# Patient Record
Sex: FEMALE | Race: WHITE | NOT HISPANIC OR LATINO | URBAN - METROPOLITAN AREA
[De-identification: names, ages, dates, MRNs, and addresses within clinical notes are randomized per-mention and may not be internally consistent; named-entity substitution may affect disease eponyms.]

---

## 2017-03-26 ENCOUNTER — EMERGENCY (EMERGENCY)
Facility: HOSPITAL | Age: 22
LOS: 1 days | Discharge: HOME | End: 2017-03-26
Attending: EMERGENCY MEDICINE | Admitting: EMERGENCY MEDICINE
Payer: COMMERCIAL

## 2017-03-26 VITALS
SYSTOLIC BLOOD PRESSURE: 128 MMHG | OXYGEN SATURATION: 100 % | TEMPERATURE: 98 F | DIASTOLIC BLOOD PRESSURE: 87 MMHG | RESPIRATION RATE: 18 BRPM | HEART RATE: 103 BPM

## 2017-03-26 VITALS
TEMPERATURE: 98 F | SYSTOLIC BLOOD PRESSURE: 110 MMHG | DIASTOLIC BLOOD PRESSURE: 60 MMHG | OXYGEN SATURATION: 100 % | RESPIRATION RATE: 16 BRPM | HEART RATE: 77 BPM

## 2017-03-26 DIAGNOSIS — R56.9 UNSPECIFIED CONVULSIONS: ICD-10-CM

## 2017-03-26 DIAGNOSIS — R07.89 OTHER CHEST PAIN: ICD-10-CM

## 2017-03-26 LAB
ALBUMIN SERPL ELPH-MCNC: 4.3 G/DL — SIGNIFICANT CHANGE UP (ref 3.4–5)
ALP SERPL-CCNC: 67 U/L — SIGNIFICANT CHANGE UP (ref 40–120)
ALT FLD-CCNC: 17 U/L — SIGNIFICANT CHANGE UP (ref 12–42)
ANION GAP SERPL CALC-SCNC: 13 MMOL/L — SIGNIFICANT CHANGE UP (ref 9–16)
AST SERPL-CCNC: 22 U/L — SIGNIFICANT CHANGE UP (ref 15–37)
BASOPHILS NFR BLD AUTO: 0.3 % — SIGNIFICANT CHANGE UP (ref 0–2)
BILIRUB SERPL-MCNC: 0.6 MG/DL — SIGNIFICANT CHANGE UP (ref 0.2–1.2)
BUN SERPL-MCNC: 13 MG/DL — SIGNIFICANT CHANGE UP (ref 7–23)
CALCIUM SERPL-MCNC: 9.3 MG/DL — SIGNIFICANT CHANGE UP (ref 8.5–10.5)
CHLORIDE SERPL-SCNC: 105 MMOL/L — SIGNIFICANT CHANGE UP (ref 96–108)
CO2 SERPL-SCNC: 22 MMOL/L — SIGNIFICANT CHANGE UP (ref 22–31)
CREAT SERPL-MCNC: 0.79 MG/DL — SIGNIFICANT CHANGE UP (ref 0.5–1.3)
D DIMER BLD IA.RAPID-MCNC: 558 NG/ML DDU — HIGH
EOSINOPHIL NFR BLD AUTO: 0.7 % — SIGNIFICANT CHANGE UP (ref 0–6)
GLUCOSE SERPL-MCNC: 140 MG/DL — HIGH (ref 70–99)
HCG SERPL-ACNC: <1 MIU/ML — SIGNIFICANT CHANGE UP
HCT VFR BLD CALC: 42 % — SIGNIFICANT CHANGE UP (ref 34.5–45)
HGB BLD-MCNC: 14.1 G/DL — SIGNIFICANT CHANGE UP (ref 11.5–15.5)
IMM GRANULOCYTES NFR BLD AUTO: 2.2 % — HIGH (ref 0–1.5)
LYMPHOCYTES # BLD AUTO: 16.7 % — SIGNIFICANT CHANGE UP (ref 13–44)
MAGNESIUM SERPL-MCNC: 1.6 MG/DL — SIGNIFICANT CHANGE UP (ref 1.6–2.4)
MCHC RBC-ENTMCNC: 29.3 PG — SIGNIFICANT CHANGE UP (ref 27–34)
MCHC RBC-ENTMCNC: 33.6 G/DL — SIGNIFICANT CHANGE UP (ref 32–36)
MCV RBC AUTO: 87.3 FL — SIGNIFICANT CHANGE UP (ref 80–100)
MONOCYTES NFR BLD AUTO: 4.1 % — SIGNIFICANT CHANGE UP (ref 2–14)
NEUTROPHILS NFR BLD AUTO: 76 % — SIGNIFICANT CHANGE UP (ref 43–77)
PLATELET # BLD AUTO: 299 K/UL — SIGNIFICANT CHANGE UP (ref 150–400)
POTASSIUM SERPL-MCNC: 4 MMOL/L — SIGNIFICANT CHANGE UP (ref 3.5–5.3)
POTASSIUM SERPL-SCNC: 4 MMOL/L — SIGNIFICANT CHANGE UP (ref 3.5–5.3)
PROT SERPL-MCNC: 7.9 G/DL — SIGNIFICANT CHANGE UP (ref 6.4–8.2)
RBC # BLD: 4.81 M/UL — SIGNIFICANT CHANGE UP (ref 3.8–5.2)
RBC # FLD: 13.3 % — SIGNIFICANT CHANGE UP (ref 10.3–16.9)
SODIUM SERPL-SCNC: 140 MMOL/L — SIGNIFICANT CHANGE UP (ref 132–145)
WBC # BLD: 9.6 K/UL — SIGNIFICANT CHANGE UP (ref 3.8–10.5)
WBC # FLD AUTO: 9.6 K/UL — SIGNIFICANT CHANGE UP (ref 3.8–10.5)

## 2017-03-26 PROCEDURE — 71275 CT ANGIOGRAPHY CHEST: CPT | Mod: 26

## 2017-03-26 PROCEDURE — 99285 EMERGENCY DEPT VISIT HI MDM: CPT | Mod: 25

## 2017-03-26 PROCEDURE — 70450 CT HEAD/BRAIN W/O DYE: CPT | Mod: 26

## 2017-03-26 PROCEDURE — 93010 ELECTROCARDIOGRAM REPORT: CPT | Mod: NC

## 2017-03-26 RX ORDER — SODIUM CHLORIDE 9 MG/ML
1000 INJECTION INTRAMUSCULAR; INTRAVENOUS; SUBCUTANEOUS ONCE
Qty: 0 | Refills: 0 | Status: COMPLETED | OUTPATIENT
Start: 2017-03-26 | End: 2017-03-26

## 2017-03-26 RX ADMIN — SODIUM CHLORIDE 500 MILLILITER(S): 9 INJECTION INTRAMUSCULAR; INTRAVENOUS; SUBCUTANEOUS at 13:32

## 2017-03-26 NOTE — ED ADULT TRIAGE NOTE - CHIEF COMPLAINT QUOTE
As per friend Masha, patient had a tonic clonic seizure of 10 mins, foam in the mouth, no vomiting. Patient currently is awake alert and oriented x 3. able to walk. No head injury,.    No Seizure HX.

## 2017-03-26 NOTE — ED PROVIDER NOTE - MEDICAL DECISION MAKING DETAILS
will check labs, though neurologically nonfocal, given no prior sz hx (except childhood febrile sz), will CT head

## 2017-03-26 NOTE — ED PROVIDER NOTE - CARE PLAN
Principal Discharge DX:	Seizure Principal Discharge DX:	Seizure  Secondary Diagnosis:	Chest tightness

## 2017-03-26 NOTE — ED PROVIDER NOTE - OBJECTIVE STATEMENT
22 yof pw seizure, witnessed by roommate.  per roommate, pt was watching videos on her phone and subsequently had what appeared to be tonic clonic sz, reportedly 10 min, w/ postical state.  pt does not recall event.  currently feels no sx.  no cp, no sob, no nv, no weakness, no change in speech.  on OCP for 8 yr, no change in meds.  on clonazepam 1-2 tabs at bedtime for sleep issues, states has been tapering dose in the last week, and did not take her pill last night.  endorses drinking on the weekend, and occasional marijuana use but denies other substances.  reported 1 time febrile sz as a child but no other sz hx.  no FHx of sz.  daily 1 cup coffee drinker.

## 2017-03-26 NOTE — ED PROVIDER NOTE - PROGRESS NOTE DETAILS
pt reevaluated, no acute change in clinical status, well appearing, labs and ct reviewed w/ patients, neuro follow up given pt subsequently states she had sternal chest tightness, hard to take a full breath, given on OCP though no tachypnea or tachycardia or hypoxia, no respiratory distress, will check d-dimer and EKG ct reviewed, pt again denies dyspnea, no apparent distress, ambulatory w/o sob

## 2017-03-26 NOTE — ED PROVIDER NOTE - PHYSICAL EXAMINATION
CON: ao x 3, HENMT: clear oropharynx, no tongue abrasion or laceration, soft neck, HEAD: atraumatic, CV: rrr, equal pulses b/l, RESP: cta b/l, GI: +BS, soft, nontender, no rebound, no guarding, SKIN: no rash, MSK: no edema, moving all extremities spontaneously, NEURO: neuro exam nonfocal, perrl, full EOMI, f-n normal, neg pronator, neg romberg, strength 5/5, normal gait, no dysmetria, no dysdiadochokinesia

## 2017-03-27 ENCOUNTER — INPATIENT (INPATIENT)
Facility: HOSPITAL | Age: 22
LOS: 1 days | Discharge: HOME | DRG: 101 | End: 2017-03-29
Attending: HOSPITALIST | Admitting: HOSPITALIST
Payer: COMMERCIAL

## 2017-03-27 ENCOUNTER — APPOINTMENT (OUTPATIENT)
Dept: NEUROLOGY | Facility: CLINIC | Age: 22
End: 2017-03-27

## 2017-03-27 VITALS
RESPIRATION RATE: 20 BRPM | HEART RATE: 73 BPM | SYSTOLIC BLOOD PRESSURE: 124 MMHG | TEMPERATURE: 98 F | WEIGHT: 110.01 LBS | OXYGEN SATURATION: 99 % | DIASTOLIC BLOOD PRESSURE: 80 MMHG

## 2017-03-27 VITALS
OXYGEN SATURATION: 99 % | HEART RATE: 68 BPM | WEIGHT: 110 LBS | SYSTOLIC BLOOD PRESSURE: 115 MMHG | BODY MASS INDEX: 21.04 KG/M2 | TEMPERATURE: 98.2 F | DIASTOLIC BLOOD PRESSURE: 77 MMHG | HEIGHT: 60.5 IN

## 2017-03-27 DIAGNOSIS — R56.9 UNSPECIFIED CONVULSIONS: ICD-10-CM

## 2017-03-27 DIAGNOSIS — E87.6 HYPOKALEMIA: ICD-10-CM

## 2017-03-27 DIAGNOSIS — R63.8 OTHER SYMPTOMS AND SIGNS CONCERNING FOOD AND FLUID INTAKE: ICD-10-CM

## 2017-03-27 DIAGNOSIS — Z41.8 ENCOUNTER FOR OTHER PROCEDURES FOR PURPOSES OTHER THAN REMEDYING HEALTH STATE: ICD-10-CM

## 2017-03-27 PROBLEM — Z00.00 ENCOUNTER FOR PREVENTIVE HEALTH EXAMINATION: Status: ACTIVE | Noted: 2017-03-27

## 2017-03-27 PROCEDURE — 99285 EMERGENCY DEPT VISIT HI MDM: CPT | Mod: 25

## 2017-03-27 PROCEDURE — 93010 ELECTROCARDIOGRAM REPORT: CPT | Mod: NC

## 2017-03-27 RX ORDER — CLONAZEPAM 1 MG
0.5 TABLET ORAL AT BEDTIME
Qty: 0 | Refills: 0 | Status: DISCONTINUED | OUTPATIENT
Start: 2017-03-27 | End: 2017-03-28

## 2017-03-27 RX ORDER — ACETAMINOPHEN 500 MG
650 TABLET ORAL EVERY 6 HOURS
Qty: 0 | Refills: 0 | Status: DISCONTINUED | OUTPATIENT
Start: 2017-03-27 | End: 2017-03-29

## 2017-03-27 RX ORDER — POTASSIUM CHLORIDE 20 MEQ
40 PACKET (EA) ORAL ONCE
Qty: 0 | Refills: 0 | Status: COMPLETED | OUTPATIENT
Start: 2017-03-27 | End: 2017-03-27

## 2017-03-27 RX ADMIN — Medication 40 MILLIEQUIVALENT(S): at 22:04

## 2017-03-27 RX ADMIN — Medication 650 MILLIGRAM(S): at 23:19

## 2017-03-27 NOTE — ED PROVIDER NOTE - OBJECTIVE STATEMENT
here for eval of seizure yesterday.  Was reportedly sitting on the couch and had witnessed gtc seizure lasting about 4 min per roommate.  911 called and taken to [facility].  Says she doesn't remember about 2 hours of yesterday.  Had been out drinking the night before.  For past few months has been taking klonipen to sleep but didn't take it the night before.  Went to see neurologist today and had office EEG that was reportedly abnormal and sent to ER for further eval.

## 2017-03-27 NOTE — ED ADULT NURSE NOTE - OBJECTIVE STATEMENT
Pt came to ED under direction of MD Sampson for abnormal EEG.  Pt had seizure yesterday and was treated at [facility] ED.  Pt currently denies any medical complaints at this time. Pt is alert and oriented x3. Positive PMS x4 extremities. Pt is currently anxious and tearful about her condition.

## 2017-03-27 NOTE — CHART NOTE - NSCHARTNOTEFT_GEN_A_CORE
As per patient, Dr. Valdez advised her to start taking her Klonopin as part of the taper tomorrow night. However, his note on [employer] does not mention about starting it tomorrow. Held off on giving patient her Klonopin as per her wishes. Will reconcile in AM with neuro.

## 2017-03-27 NOTE — ED ADULT NURSE NOTE - CHPI ED SYMPTOMS NEG
no confusion/no blurred vision/no loss of consciousness/no dizziness/no numbness/no weakness/no change in level of consciousness/no fever/no nausea/No tremors/no vomiting

## 2017-03-27 NOTE — ED PROVIDER NOTE - MEDICAL DECISION MAKING DETAILS
seizure with abnormal outpatient EEG.  per neurologist, would like to admit for EEG monitoring overnight. possible klonipen withdrawal seizure

## 2017-03-27 NOTE — H&P ADULT - HISTORY OF PRESENT ILLNESS
Hx obtained from patient and from detailed outpatient note written by neurologist.   Patient is a 23 y/o female who presents to [facility] after seizure and after being instructed to be evaluated by her neurologist Dr. Valdez. Saturday night the patient was out drinking with friends and had more to drink than usual. The next morning her roommate found her on the couch and was concerned b/c the patient did not acknowledge her. The patient did not respond to her name being called and suddenly her entire body started jerking, eyes rolled back, and she was noted to be foaming at the mouth. This lasted for about 2-3 minutes, then the pt was lethargic and confused which gradually improved over the course of the day. She denied feeling any sort of aura beforehand. There was no tongue biting and no incontinence. EMS was called immediately and she was taken to [facility] where CT head was unremarkable.   Patient states that she has battled with ‘sleep issues’ which have gotten especially worse for the past 2 months & she has been taking Klonopin inconsistently at varying doses.  The night before the seizure she had no Klonopin, but was taking it almost every night for the prior week. In addition, she has a history of febrile seizure when she was around 1 y/o. No family history history of seizures in immediate relatives. She went to Dr. Valdez her o/p neurologist who performed an ambulatory vEEG, noted some abnormalities and subsequently advised her to come be evaluated at [facility].   No fevers recently, no head trauma. No chills, sob, n/v/d, sick contacts, no numbness or tingling.   VSS on admission. CT head again negative as well as CT chest. 21 y/o  woman presenting for admission after a seizure.    Saturday night the pt was drinking (had 2 drinks at dinner, then 2 at her apartment, not an unusual amount for her on the weekend). The next morning her roommate found her on the couch with her arm behind her head, and she did not acknowledge her which was odd. The roommate called her name but she didn't answer, then her whole body started jerking, eyes rolled back, foamed at the mouth. This lasted for about 2-3 minutes, then the pt was lethargic and confused which gradually improved over the course of the day. There was no tongue bite and no incontinence. EMS was called immediately and she was taken to [facility] where CT head was unremarkable.   Of note, the pt has chronic poor sleep (very difficult for her to fall asleep), and for the past 2 months she has been taking Klonopin most nights (ranging between 0.25 mg to 1 mg per night) and sometimes takes none at night. The night before the seizure she had no Klonopin, but was taking it almost every night for the prior week.   Of note the patient has a history of febrile seizure when she was around 1 y/o, and a distant cousin has epilepsy of unclear etiology.  Never had a head strike with subsequent LOC. No hx of CNS infections.  She has some diffuse muscle soreness since the seizure.  The patient also has daily spells (numerous times per day) of bilateral eyelid fluttering, with no LOC. She has had this for years.   No hx of morning jerks or dropping items.     Of note around age 18 she had severe headaches which originated behind her left eye. She had an MRI brain which was reportedly normal, and normal eye exam. She has this type of headache rarely now, and not as severe.     The patient was seen by Dr. Valdez in clinic 3/27 where routine EEG showed possible generalized epileptiform discharges. The patient is now being admitted for further seizure work-up while Klonopin is held.

## 2017-03-27 NOTE — H&P ADULT - ASSESSMENT
Patient is a 21 y/o female with PMHx febrile seizures as an infant and history of sleep disturbance who was taking Klonopin inconsistently who presents for evaluation of a seizure. Differential of seizure is epilepsy, BZD w/d seizure, or EtOH withdrawal although the latter much less likely as patient not a consistent drinker.         - MRI brain w/o con, epilepsy protocol.  - routine EEG, then check ambulatory EEG.  - no indication for an AED at this time.  - sleep study and sleep disorder referral for chronic severe difficulty falling asleep.  - advise taper of Klonopin since sporadic dosing might have provoked a withdrawal seizure and sleep is still very poor even while using this. Another medication or intervention may be attempted after her sleep evaluation. Take 0.5 mg for the next 3 nights, then 0.25 mg for 3 nights, then stop.   - no driving for at least 6 months.   - follow-up in 1 month or sooner if new or worsening symptoms. Patient is a 23 y/o female with PMHx febrile seizures as an infant and history of sleep disturbance who was taking Klonopin inconsistently as well as skipping doses who presents for evaluation of a seizure. Differential of seizure is epilepsy, BZD w/d seizure, or EtOH withdrawal although the latter much less likely as patient not a consistent drinker. Patient is a 23 y/o female with PMHx febrile seizures as an infant and history of sleep disturbance who was taking Klonopin inconsistently as well as skipping doses who presents for evaluation of a seizure. 21 y/o RH woman presenting for epilepsy work-up after a generalized seizure. Her seizure might have been provoked by Klonopin withdrawal, though the dose she was on was not particularly large (0.5 to 1 mg nightly) and she previously skipped doses at night several times over the past 2 months and felt fine (no withdrawal symptoms, no prior seizures). Epilepsy risk factors include a cousin with epilepsy and 1 prior febrile seizure at age 1 y/o. Her routine EEG in clinic 3/27 showed generalized frontally-predominant spike/wave discharges suspicious for either generalized hyperexcitability vs left frontal hyperexcitability with secondary bilateral synchrony. The patient took 0.5 mg of Klonopin last night, and our plan will be to hold further Klonopin doses and attempt to further characterize her EEG and possibly capture a spell.

## 2017-03-27 NOTE — H&P ADULT - PROBLEM SELECTOR PLAN 3
No need for HSQ as pt young and low risk. - pt does not want heparin subq. She will ambulate regularly. Can do venodynes if she is ok with this.

## 2017-03-27 NOTE — H&P ADULT - NSHPLABSRESULTS_GEN_ALL_CORE
.  LABS:                         13.8   10.9  )-----------( 292      ( 27 Mar 2017 18:55 )             41.1     27 Mar 2017 18:55    142    |  105    |  10     ----------------------------<  125    3.4     |  28     |  0.61     Ca    9.2        27 Mar 2017 18:55  Mg     1.6       26 Mar 2017 13:28    TPro  7.5    /  Alb  3.9    /  TBili  1.0    /  DBili  x      /  AST  19     /  ALT  20     /  AlkPhos  75     27 Mar 2017 18:55                  RADIOLOGY, EKG & ADDITIONAL TESTS: Reviewed.

## 2017-03-27 NOTE — H&P ADULT - ATTENDING COMMENTS
I modified the note above with the exception of the physical exam section. The examination below is my own:    gen: no acute distress.  HEENT: trachea midline, no jaundice or icterus.  CV: RRR, s1+s2, - m/r/g  Pulm: CTAB  Abd: soft, nt, nd  Ext: well-perfused, no edema.  Alertness: eyes open, pt attentive to examiner.  Orientation: person accurate, date accurate, place accurate.  Language: naming intact. Repetition intact. No paraphasias or neologisms. Fluent with appropriate sentences.  Attention: TABLE forwards intact, backwards intact.  Calculation: 7 quarters in $1.75.  Visual/tactile neglect?: none.  Memory: registration of 3 words intact, repetition at 5 minutes 2/3, then 3/3 with hints.  Right left confusion?: no.  Finger agnosia?: no.  II: Visual fields intact.  III, IV, VI: EOMI. No nystagmus. PERRLA 3>2 bilaterally. Discs poorly visualized.  V: intact to light touch.  VII: smile symmetrical. Eyebrow elevation symmetrical. Eye closure symmetrical. No flattening of nasolabial fold.  VIII: Able to localize finger rub.  IX, X: palate elevation symmetrical.  XI: sternocleidomastoid 5R/5L, trapezius 5R/5L  XII: no tongue deviation.  Motor: no atrophy or fasciculations. No tremor. No hypo/hyperkinesia. No pronator drift. Tone normal. Finger tap rapid and equal on both sides. Strength: deltoids 5R/5L, biceps 5R/5L, triceps 5R/5L, wrist flexors 5R/5L, wrist extensors 5R/5L,  strong and equal R/L, hip flexors 5R/5L, leg flexors 5R/5L, leg extensors 5R/5L, ankle plantarflexion 5R/5L, ankle dorsiflexion 5R/5L  Reflexes: biceps 2R/2L, triceps 2R/2L, brachioradialis 2R/2L, patellar 2R/2L, ankles 2R/2L. Toes down R/L  Coordination: finger to nose without dysmetria or overshoot R/L. Heel to tovar without dysmetria or overshoot R/L. Romberg negative.  Gait: normal heel/toe/tandem.  Sensory: intact on R and L hemibody to light touch, pin, proprioception, vibration.    Of note, I submitted charges for my office visit which occurred several hours before this admission. I did not submit a bill for this H&P.

## 2017-03-27 NOTE — ED ADULT TRIAGE NOTE - CHIEF COMPLAINT QUOTE
patient accompanied to ER by epilepsy physician. she was seen at [facility] yesterday after having a first time seizure. she states that she has been struggling with insomnia recently. patient to be admitted to Dr. Kentrell Valdez from Epilepsy for VEEG monitoring.

## 2017-03-27 NOTE — H&P ADULT - NSHPPHYSICALEXAM_GEN_ALL_CORE
.  VITAL SIGNS:  T(C): 37.1, Max: 37.1 (03-27 @ 19:46)  T(F): 98.8, Max: 98.8 (03-27 @ 19:46)  HR: 74 (73 - 74)  BP: 110/70 (110/70 - 124/80)  BP(mean): --  RR: 18 (18 - 20)  SpO2: 98% (98% - 99%)  Wt(kg): --    PHYSICAL EXAM:    Constitutional: WDWN resting comfortably in bed; NAD  Head: NC/AT  Eyes: PERRL, EOMI, anicteric sclera  ENT: no nasal discharge; uvula midline, no oropharyngeal erythema or exudates; MMM  Neck: supple; no JVD or thyromegaly  Respiratory: CTA B/L; no W/R/R, no retractions  Cardiac: +S1/S2; RRR; no M/R/G; PMI non-displaced  Gastrointestinal: soft, NT/ND; no rebound or guarding; +BSx4  Genitourinary: normal external genitalia  Back: spine midline, no bony tenderness or step-offs; no CVAT B/L  Extremities: WWP, no clubbing or cyanosis; no peripheral edema  Musculoskeletal: NROM x4; no joint swelling, tenderness or erythema  Vascular: 2+ radial, femoral, DP/PT pulses B/L  Dermatologic: skin warm, dry and intact; no rashes, wounds, or scars  Lymphatic: no submandibular or cervical LAD  Neurologic: AAOx3; CNII-XII grossly intact; no focal deficits  Psychiatric: affect and characteristics of appearance, verbalizations, behaviors are appropriate

## 2017-03-27 NOTE — H&P ADULT - PROBLEM SELECTOR PLAN 1
Differential of seizure is epilepsy, BZD w/d seizure, or EtOH withdrawal although the latter much less likely as patient not a consistent drinker.  Recommendations as per Dr. Valdez:  - MRI brain w/o con, epilepsy protocol.  - routine EEG, then check ambulatory EEG.  - no indication for an AED at this time.  - o/p sleep study and sleep disorder referral for chronic severe difficulty falling asleep.  - advise taper of Klonopin. Take 0.5 mg for the next 3 nights, then 0.25 mg for 3 nights, then stop.   - if patient seizes again, can consider Ativan 2 mg IV prn Differential of seizure is epilepsy, BZD w/d seizure, or EtOH withdrawal although the latter much less likely as patient not a consistent drinker. Patient has hx of febrile seizures and no hx of epilepsy immediate relatives.   She has had sleep problems and has been taking varying doses of Klonopin. Suddenly stopping the BZD could have predisposed her to this seizure.   Recommendations as per Dr. Valdez:  - MRI brain w/o con, epilepsy protocol.  - routine EEG, then check ambulatory EEG.  - no indication for an AED at this time.  - o/p sleep study and sleep disorder referral for chronic severe difficulty falling asleep.  - advise taper of Klonopin. Take 0.5 mg for the next 3 nights, then 0.25 mg for 3 nights, then stop.   - if patient seizes again, can consider Ativan 2 mg IV prn Differential of seizure is epilepsy, BZD w/d seizure, or EtOH withdrawal although the latter much less likely as patient not a consistent drinker. Patient has hx of febrile seizures and no hx of epilepsy immediate relatives.   She has had sleep problems and has been taking varying doses of Klonopin. Suddenly stopping the BZD could have predisposed her to this seizure.   Recommendations as per Dr. Valdez:  - MRI brain w/o con, epilepsy protocol.  - routine EEG, then check ambulatory EEG.  - no indication for an AED at this time.  - o/p sleep study and sleep disorder referral for chronic severe difficulty falling asleep.  - taper of Klonopin. Take 0.5 mg for the next 3 nights, then 0.25 mg for 3 nights, then stop.   - if patient seizes again, can consider Ativan 2 mg IV prn - continuous EEG.  - seizure precautions.  - hold Klonopin.  - for convulsive seizure >2 minutes give ativan 1 mg IV and call Dr. José Miguel MARTINEZ.   - MRI brain w/wo con, epilepsy protocol Wednesday morning

## 2017-03-27 NOTE — ED ADULT NURSE NOTE - CHIEF COMPLAINT QUOTE
patient accompanied to ER by epilepsy physician. she was seen at [facility] yesterday after having a first time seizure. she states that she has been struggling with insomnia recently. patient to be admitted to Dr. Kentrell Valdez from Epilepsy for VEEG monitoring.

## 2017-03-28 DIAGNOSIS — I82.409 ACUTE EMBOLISM AND THROMBOSIS OF UNSPECIFIED DEEP VEINS OF UNSPECIFIED LOWER EXTREMITY: ICD-10-CM

## 2017-03-28 DIAGNOSIS — R79.89 OTHER SPECIFIED ABNORMAL FINDINGS OF BLOOD CHEMISTRY: ICD-10-CM

## 2017-03-28 PROCEDURE — 99232 SBSQ HOSP IP/OBS MODERATE 35: CPT

## 2017-03-28 PROCEDURE — 93970 EXTREMITY STUDY: CPT | Mod: 26

## 2017-03-28 RX ORDER — HEPARIN SODIUM 5000 [USP'U]/ML
5000 INJECTION INTRAVENOUS; SUBCUTANEOUS EVERY 8 HOURS
Qty: 0 | Refills: 0 | Status: DISCONTINUED | OUTPATIENT
Start: 2017-03-28 | End: 2017-03-29

## 2017-03-28 RX ORDER — LANOLIN ALCOHOL/MO/W.PET/CERES
3 CREAM (GRAM) TOPICAL ONCE
Qty: 0 | Refills: 0 | Status: COMPLETED | OUTPATIENT
Start: 2017-03-28 | End: 2017-03-28

## 2017-03-28 RX ORDER — CLONAZEPAM 1 MG
0.5 TABLET ORAL AT BEDTIME
Qty: 0 | Refills: 0 | Status: DISCONTINUED | OUTPATIENT
Start: 2017-03-28 | End: 2017-03-28

## 2017-03-28 RX ADMIN — Medication 3 MILLIGRAM(S): at 02:11

## 2017-03-28 RX ADMIN — Medication 650 MILLIGRAM(S): at 00:11

## 2017-03-28 NOTE — PROGRESS NOTE ADULT - ASSESSMENT
23 yo W pmh febrile seizures, sleep disturbance disorder on clonazepam presented after witnessed seizure 23 y/o RH woman presenting for epilepsy work-up after a generalized seizure. Her seizure might have been provoked by Klonopin withdrawal, though the dose she was on was not particularly large (0.5 to 1 mg nightly) and she previously skipped doses at night several times over the past 2 months and felt fine (no withdrawal symptoms, no prior seizures). Epilepsy risk factors include a cousin with epilepsy and 1 prior febrile seizure at age 1 y/o. Her routine EEG in clinic 3/27 showed generalized frontally-predominant spike/wave discharges suspicious for either generalized hyperexcitability vs left frontal hyperexcitability with secondary bilateral synchrony. The patient took 0.5 mg of Klonopin the night before admission. Klonopin has been held since admission. So far continuous EEG shows occasional generalized frontally-predominant (more prominent on the left) spike and polyspike/wave discharges while awake and asleep with no obvious clinical correlate. These discharges are suggestive of either a generalized epilepsy syndrome vs left frontal hyperexcitability with secondary bilateral synchrony.

## 2017-03-28 NOTE — PROGRESS NOTE ADULT - PROBLEM SELECTOR PLAN 1
witnessed seizure yesterday. ddx clonazepam withdrawal vs epilepsy. vEEG shows generalized spike waves. neurologist has high suspicion for epilepsy. dc'd clonazepam. will c/w vEEG with stimulation. after vEEG will have brain MRI w and w/o contrast for epilepsy w/u. - continue EEG.  - hold Klonopin.   - seizure precautions.  - sleep deprive tonight.   - after vEEG will have brain MRI w and w/o contrast for epilepsy w/u.  - for convulsive seizure >2 minutes give ativan 1 mg IV and call Dr. José Miguel MARTINEZ.

## 2017-03-28 NOTE — PROGRESS NOTE ADULT - ATTENDING COMMENTS
I modified the note above with the exception of the physical exam section. The exam below is my own:    Eyes open spontaneously. Conversational with appropriate sentences.  EOMI. Visual fields full. PERRL 3>2. Face symmetrical.  Full strength throughout.  Finger-nose-finger intact R/L.  Intact to light touch throughout.

## 2017-03-28 NOTE — PROGRESS NOTE ADULT - SUBJECTIVE AND OBJECTIVE BOX
INTERVAL HPI/OVERNIGHT EVENTS: JENIFER  ROS:    VITAL SIGNS:  T(F): 97.6, Max: 98.8 (03-27 @ 19:46)  HR: 62 (59 - 74)  BP: 104/59 (104/59 - 124/80)  RR: 14 (12 - 20), SpO2: 98% (98% - 100%) RA    PHYSICAL EXAM:  Constitutional:  Eyes:  ENMT:  Neck:  Respiratory:  Cardiovascular:  Gastrointestinal:  Extremities:  Vascular:  Neurological:  Musculoskeletal:    LABS:    RADIOLOGY & ADDITIONAL TESTS:    MEDICATIONS  (STANDING):  clonazePAM Tablet 0.5milliGRAM(s) Oral at bedtime    MEDICATIONS  (PRN):  acetaminophen   Tablet. 650milliGRAM(s) Oral every 6 hours PRN Moderate Pain (4 - 6)    Allergies  No Known Allergies INTERVAL HPI/OVERNIGHT EVENTS: JENIFER  ROS: No seizures.     VITAL SIGNS:  T(F): 97.6, Max: 98.8 (03-27 @ 19:46)  HR: 62 (59 - 74)  BP: 104/59 (104/59 - 124/80)  RR: 14 (12 - 20), SpO2: 98% (98% - 100%) RA    PHYSICAL EXAM:  Constitutional: well appearing young woman, A&O x 3  Eyes: PERRL, EOMI  ENMT: MMM  Respiratory: CTAB  Cardiovascular: RRR, no murmurs  Gastrointestinal: soft, NTND  Extremities: WWP. No edema    LABS: No new labs    RADIOLOGY & ADDITIONAL TESTS: No new imaging    MEDICATIONS  (STANDING):  clonazePAM Tablet 0.5milliGRAM(s) Oral at bedtime    MEDICATIONS  (PRN):  acetaminophen   Tablet. 650milliGRAM(s) Oral every 6 hours PRN Moderate Pain (4 - 6)    Allergies  No Known Allergies INTERVAL HPI/OVERNIGHT EVENTS: JENIFER  ROS: No seizures, no complaints.     VITAL SIGNS:  T(F): 97.6, Max: 98.8 (03-27 @ 19:46)  HR: 62 (59 - 74)  BP: 104/59 (104/59 - 124/80)  RR: 14 (12 - 20), SpO2: 98% (98% - 100%) RA    PHYSICAL EXAM:  Constitutional: well appearing young woman, A&O x 3  Eyes: PERRL, EOMI  ENMT: MMM  Respiratory: CTAB  Cardiovascular: RRR, no murmurs  Gastrointestinal: soft, NTND  Extremities: WWP. No edema    LABS: No new labs    RADIOLOGY & ADDITIONAL TESTS: No new imaging    MEDICATIONS  (STANDING):  clonazePAM Tablet 0.5milliGRAM(s) Oral at bedtime    MEDICATIONS  (PRN):  acetaminophen   Tablet. 650milliGRAM(s) Oral every 6 hours PRN Moderate Pain (4 - 6)    Allergies  No Known Allergies

## 2017-03-29 ENCOUNTER — TRANSCRIPTION ENCOUNTER (OUTPATIENT)
Age: 22
End: 2017-03-29

## 2017-03-29 VITALS
OXYGEN SATURATION: 97 % | HEART RATE: 86 BPM | DIASTOLIC BLOOD PRESSURE: 76 MMHG | RESPIRATION RATE: 16 BRPM | SYSTOLIC BLOOD PRESSURE: 109 MMHG | TEMPERATURE: 98 F

## 2017-03-29 PROCEDURE — 85025 COMPLETE CBC W/AUTO DIFF WBC: CPT

## 2017-03-29 PROCEDURE — 93005 ELECTROCARDIOGRAM TRACING: CPT

## 2017-03-29 PROCEDURE — 80053 COMPREHEN METABOLIC PANEL: CPT

## 2017-03-29 PROCEDURE — 95951: CPT

## 2017-03-29 PROCEDURE — 99239 HOSP IP/OBS DSCHRG MGMT >30: CPT

## 2017-03-29 PROCEDURE — 93970 EXTREMITY STUDY: CPT

## 2017-03-29 PROCEDURE — 36415 COLL VENOUS BLD VENIPUNCTURE: CPT

## 2017-03-29 PROCEDURE — 99285 EMERGENCY DEPT VISIT HI MDM: CPT | Mod: 25

## 2017-03-29 RX ORDER — CHOLECALCIFEROL (VITAMIN D3) 125 MCG
1000 CAPSULE ORAL DAILY
Qty: 0 | Refills: 0 | Status: DISCONTINUED | OUTPATIENT
Start: 2017-03-29 | End: 2017-03-29

## 2017-03-29 RX ORDER — LEVETIRACETAM 250 MG/1
1 TABLET, FILM COATED ORAL
Qty: 60 | Refills: 0 | OUTPATIENT
Start: 2017-03-29 | End: 2017-04-28

## 2017-03-29 RX ORDER — CALCIUM CARBONATE 500(1250)
1 TABLET ORAL DAILY
Qty: 0 | Refills: 0 | Status: DISCONTINUED | OUTPATIENT
Start: 2017-03-29 | End: 2017-03-29

## 2017-03-29 RX ORDER — CLONAZEPAM 1 MG
0 TABLET ORAL
Qty: 0 | Refills: 0 | COMMUNITY

## 2017-03-29 RX ORDER — LEVETIRACETAM 250 MG/1
500 TABLET, FILM COATED ORAL
Qty: 0 | Refills: 0 | Status: DISCONTINUED | OUTPATIENT
Start: 2017-03-29 | End: 2017-03-29

## 2017-03-29 RX ORDER — FOLIC ACID 0.8 MG
1 TABLET ORAL
Qty: 30 | Refills: 0 | OUTPATIENT
Start: 2017-03-29 | End: 2017-04-28

## 2017-03-29 RX ORDER — CHOLECALCIFEROL (VITAMIN D3) 125 MCG
800 CAPSULE ORAL DAILY
Qty: 0 | Refills: 0 | Status: DISCONTINUED | OUTPATIENT
Start: 2017-03-29 | End: 2017-03-29

## 2017-03-29 RX ORDER — FOLIC ACID 0.8 MG
1 TABLET ORAL DAILY
Qty: 0 | Refills: 0 | Status: DISCONTINUED | OUTPATIENT
Start: 2017-03-29 | End: 2017-03-29

## 2017-03-29 RX ORDER — CHOLECALCIFEROL (VITAMIN D3) 125 MCG
1000 CAPSULE ORAL
Qty: 30000 | Refills: 0 | OUTPATIENT
Start: 2017-03-29 | End: 2017-04-28

## 2017-03-29 RX ORDER — CALCIUM CARBONATE 500(1250)
1 TABLET ORAL
Qty: 30 | Refills: 0 | OUTPATIENT
Start: 2017-03-29 | End: 2017-04-28

## 2017-03-29 RX ORDER — FOLIC ACID 0.8 MG
0.8 TABLET ORAL DAILY
Qty: 0 | Refills: 0 | Status: DISCONTINUED | OUTPATIENT
Start: 2017-03-29 | End: 2017-03-29

## 2017-03-29 RX ADMIN — LEVETIRACETAM 500 MILLIGRAM(S): 250 TABLET, FILM COATED ORAL at 11:54

## 2017-03-29 NOTE — PROGRESS NOTE ADULT - ASSESSMENT
21 y/o RH woman presenting for epilepsy work-up after a generalized seizure. Her seizure might have been provoked by Klonopin withdrawal, though the dose she was on was not particularly large (0.5 to 1 mg nightly) and she previously skipped doses at night several times over the past 2 months and felt fine (no withdrawal symptoms, no prior seizures). Epilepsy risk factors include a cousin with epilepsy and 1 prior febrile seizure at age 1 y/o. Her routine EEG in clinic 3/27 showed generalized frontally-predominant spike/wave discharges suspicious for either generalized hyperexcitability vs left frontal hyperexcitability with secondary bilateral synchrony. The patient took 0.5 mg of Klonopin the night before admission. Klonopin has been held since admission. So far continuous EEG shows occasional generalized frontally-predominant (more prominent on the left) spike and polyspike/wave discharges while awake and asleep with no obvious clinical correlate. These discharges are suggestive of either a generalized epilepsy syndrome vs left frontal hyperexcitability with secondary bilateral synchrony.

## 2017-03-29 NOTE — DISCHARGE NOTE ADULT - ADDITIONAL INSTRUCTIONS
Please make an appointment to see Dr. Valdez is 2 weeks  [phone] Please call Dr. Valdez' office to complete prior authorization for an MRI and to make an appointment to see Dr. Valdez is 2 weeks  [phone]

## 2017-03-29 NOTE — DISCHARGE NOTE ADULT - MEDICATION SUMMARY - MEDICATIONS TO STOP TAKING
I will STOP taking the medications listed below when I get home from the hospital:    KlonoPIN  --  by mouth

## 2017-03-29 NOTE — DISCHARGE NOTE ADULT - HOSPITAL COURSE
23 y/o W pmh febrile seizures as child, h epilepsy, chronic poor sleep on clonazepam, presented with witnessed tonic-clonic seizure episode (3/26). In ED CTH WNL. Pt complained of dyspnea which self resolved, ddimer was elevated and CTPE was negative. She was admitted to epilepsy floor for seizure w/u. Routine EEG showed generalized frontally-predominant spike/wave discharges suspicious for either generalized hyperexcitability vs left frontal hyperexcitability with secondary bilateral synchrony. The initial ddx was clonazepam withdrawal vs. genearlized epilepsy. Clonazepam was held. Pt was put on continuous vEEG with sleep deprivation and photo stimulation. 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 LE duplex was negative for DVT. She is being discharged in stable condition with prescriptions for keppra, folic acid, vitamin D, calcium, and brain MRI. She is instructed to make a follow up appointment with her neurologist Dr. Valdez (neurology) in 2 weeks. The primary team made multiple attempts to schedule an appointment prior to discharge but was sent right to voicemail. 21 y/o W pmh febrile seizures as child, h epilepsy, chronic poor sleep on clonazepam, presented with witnessed tonic-clonic seizure episode (3/26). In ED CTH WNL. Pt complained of dyspnea which self resolved, ddimer was elevated and CTPE was negative. She was discharged and instructed to make an appointment for neurologist. On 3/27 she saw Dr. Valdez as an outpatient. Routine EEG showed generalized frontally-predominant spike/wave discharges suspicious for either generalized hyperexcitability vs left frontal hyperexcitability with secondary bilateral synchrony. The initial ddx was clonazepam withdrawal vs. genearlized epilepsy. She was admitted directly to [facility] for seizure w/u. Clonazepam was held. Pt was put on continuous vEEG with sleep deprivation and photo stimulation. 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 LE duplex was negative for DVT. She received an Epilepsy protocol MRI prior to discharge. She is being discharged in stable condition with prescriptions for keppra, folic acid, vitamin D, calcium. She is instructed to make a follow up appointment with her neurologist Dr. Valdez (neurology) in 2 weeks. The primary team made multiple attempts to schedule an appointment prior to discharge but was sent right to voicemail. 23 y/o W pmh febrile seizures as child, h epilepsy, chronic poor sleep on clonazepam, presented with witnessed tonic-clonic seizure episode (3/26). In ED CTH WNL. Pt complained of dyspnea which self resolved, ddimer was elevated and CTPE was negative. She was discharged and instructed to make an appointment for neurologist. On 3/27 she saw Dr. Valdez as an outpatient. Routine EEG showed generalized frontally-predominant spike/wave discharges suspicious for either generalized hyperexcitability vs left frontal hyperexcitability with secondary bilateral synchrony. The initial ddx was clonazepam withdrawal vs. genearlized epilepsy. She was admitted directly to [facility] for seizure w/u. Clonazepam was held. Pt was put on continuous vEEG with sleep deprivation and photo stimulation. 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 LE duplex was negative for DVT. She received an Epilepsy protocol MRI prior to discharge. She is being discharged in stable condition with prescriptions for keppra, folic acid, vitamin D, calcium. She is instructed to follow up with her neurologist's office for authorization for outpatient MRI. She is instructed to make a follow up appointment with her neurologist Dr. Valdez (neurology) in 2 weeks. The primary team made multiple attempts to schedule an appointment prior to discharge but was sent right to voicemail. 21 y/o W pmh febrile seizures as child, h epilepsy, chronic poor sleep on clonazepam, presented with witnessed tonic-clonic seizure episode (3/26). In ED CTH WNL. Pt complained of dyspnea which self resolved, ddimer was elevated and CTPE was negative. She was discharged and instructed to make an appointment for neurologist. On 3/27 she saw Dr. Valdez as an outpatient. Routine EEG showed generalized frontally-predominant spike/wave discharges suspicious for either generalized hyperexcitability vs left frontal hyperexcitability with secondary bilateral synchrony. The initial ddx was clonazepam withdrawal vs. genearlized epilepsy. She was admitted directly to [facility] for seizure w/u. Clonazepam was held. Pt was put on continuous vEEG with sleep deprivation and photo stimulation. 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 LE duplex was negative for DVT. She received an Epilepsy protocol MRI prior to discharge. She is being discharged in stable condition with prescriptions for keppra, folic acid, vitamin D, calcium. She is instructed to not drive a vehicle for 1 year. The patient expressed understanding. She is instructed to follow up with her neurologist's office for authorization for outpatient MRI. She is instructed to make a follow up appointment with her neurologist Dr. Valdez (neurology) in 2 weeks. The primary team made multiple attempts to schedule an appointment prior to discharge but was sent right to voicemail. 21 y/o W pmh febrile seizures as child, h epilepsy, chronic poor sleep on clonazepam, presented with witnessed tonic-clonic seizure (3/26). In ED CTH WNL. Pt complained of dyspnea which self resolved, ddimer was elevated and CTPE was negative. She was discharged and instructed to make an appointment for neurologist. On 3/27 she saw Dr. Valdez as an outpatient. Routine EEG showed generalized frontally-predominant spike/wave discharges suspicious for either generalized hyperexcitability vs left frontal hyperexcitability with secondary bilateral synchrony. The initial ddx was clonazepam withdrawal vs. genearlized epilepsy. She was admitted directly to [facility] for seizure w/u. Clonazepam was held. Pt was put on continuous vEEG with sleep deprivation and photo stimulation. 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 LE duplex was negative for DVT. She is being discharged in stable condition with prescriptions for keppra, folic acid, vitamin D, calcium. She is instructed to not drive a vehicle for 1 year. The patient expressed understanding. She is instructed to follow up with her neurologist's office for authorization for outpatient MRI. She is instructed to make a follow up appointment with her neurologist Dr. Valdez (neurology) in 2 weeks. The primary team made multiple attempts to schedule an appointment prior to discharge but was sent right to voicemail.

## 2017-03-29 NOTE — DISCHARGE NOTE ADULT - MEDICATION SUMMARY - MEDICATIONS TO TAKE
I will START or STAY ON the medications listed below when I get home from the hospital:    kelly  --     -- Indication: For Birth control    calcium carbonate 1250 mg (500 mg elemental calcium) oral tablet  -- 1 tab(s) by mouth once a day  -- Indication: For Vitamin supplement    levETIRAcetam 500 mg oral tablet  -- 1 tab(s) by mouth 2 times a day  -- Indication: For Anti-seizure medication    cholecalciferol oral tablet  -- 1000 unit(s) by mouth once a day  -- Indication: For Vitamin supplement    folic acid 1 mg oral tablet  -- 1 tab(s) by mouth once a day  -- Indication: For Vitamin supplement

## 2017-03-29 NOTE — PROGRESS NOTE ADULT - PROBLEM SELECTOR PLAN 1
- continue EEG.  - plan on MRI brain today  - hold Klonopin.   - seizure precautions.  - sleep deprive tonight.   - after vEEG will have brain MRI w and w/o contrast for epilepsy w/u.  - for convulsive seizure >2 minutes give ativan 1 mg IV and call Dr. José Miguel MARTINEZ.

## 2017-03-29 NOTE — PROGRESS NOTE ADULT - SUBJECTIVE AND OBJECTIVE BOX
INTERVAL HPI/OVERNIGHT EVENTS: JENIFER  ROS:    VITAL SIGNS:  T(F): 97.4, Max: 98.2 (03-28 @ 17:38)  HR: 63 (63 - 76)  BP: 100/55 (100/55 - 120/75)  RR: 14 (14 - 16), SpO2: 98% (95% - 99%)    PHYSICAL EXAM:  Constitutional:  Eyes:  ENMT:  Neck:  Respiratory:  Cardiovascular:  Gastrointestinal:  Extremities:  Vascular:  Neurological:  Musculoskeletal:    LABS:   RADIOLOGY & ADDITIONAL TESTS:  LE Duplex: Impression:  No evidence of DVT.    MEDICATIONS  (STANDING):  heparin  Injectable 5000Unit(s) SubCutaneous every 8 hours    MEDICATIONS  (PRN):  acetaminophen   Tablet. 650milliGRAM(s) Oral every 6 hours PRN Moderate Pain (4 - 6)    Allergies  No Known Allergies INTERVAL HPI/OVERNIGHT EVENTS: JENIFER  ROS: No witnessed seizures.     VITAL SIGNS:  T(F): 97.4, Max: 98.2 (03-28 @ 17:38)  HR: 63 (63 - 76)  BP: 100/55 (100/55 - 120/75)  RR: 14 (14 - 16), SpO2: 98% (95% - 99%) RA    PHYSICAL EXAM:  Constitutional: well appearing young woman, NAD  Respiratory: CTAB  Cardiovascular: RRR, no murmurs  Gastrointestinal: soft, NTND  Extremities: WWP. No edema    LABS: No new labs   RADIOLOGY & ADDITIONAL TESTS:  LE Duplex: Impression:  No evidence of DVT.    MEDICATIONS  (STANDING):  heparin  Injectable 5000Unit(s) SubCutaneous every 8 hours    MEDICATIONS  (PRN):  acetaminophen   Tablet. 650milliGRAM(s) Oral every 6 hours PRN Moderate Pain (4 - 6)    Allergies  No Known Allergies

## 2017-03-29 NOTE — DISCHARGE NOTE ADULT - PLAN OF CARE
To figure out the cause of your seizures and to treat them You came to the hospital after a witnessed seizure episode. You were monitored on a video EEG and the neurologist was able to identify that you have a seizure disorder called epilepsy. We are sending you home with a prescription for keppra, calcium, folic acid, and vitamin D. All of these medicines are intended to prevent future seizures. Please get an MRI brain as an outpatient. Please make an appointment to follow up with Dr. Valdez in 2 weeks. You came to the hospital after a witnessed seizure episode. You were monitored on a video EEG and the neurologist was able to identify that you have a seizure disorder called epilepsy. We are sending you home with a prescription for keppra, calcium, folic acid, and vitamin D. All of these medicines are intended to prevent future seizures.  Please make an appointment to follow up with Dr. Valdez in 2 weeks. You came to the hospital after a witnessed seizure episode. You were monitored on a video EEG and the neurologist was able to identify that you have a seizure disorder called epilepsy. We are sending you home with a prescription for keppra, calcium, folic acid, and vitamin D. All of these medicines are intended to prevent future seizures. Please call Dr. Valdez' office to help organize you for an outpatient MRI.  Please make an appointment to follow up with Dr. Valdez in 2 weeks. You came to the hospital after a witnessed seizure episode. You were monitored on a video EEG and the neurologist was able to identify that you have a seizure disorder called epilepsy. We are sending you home with a prescription for keppra, calcium, folic acid, and vitamin D. All of these medicines are intended to prevent future seizures. Please call Dr. Valdez' office to help organize you for an outpatient MRI.  Please make an appointment to follow up with Dr. Valdez in 2 weeks. If you have seizure symptoms before your appointment then please see Dr. Valdez as soon as possible.

## 2017-03-29 NOTE — DISCHARGE NOTE ADULT - PATIENT PORTAL LINK FT
“You can access the [facility] Patient Portal, offered by [facility], by registering with the following website: [email or web address]”

## 2017-03-29 NOTE — DISCHARGE NOTE ADULT - CARE PROVIDER_API CALL
Kentrell Valdez (MD), Clinical Neurophysiology; Neurology  [address] [place] [address]  Phone: [phone]  Fax: [phone]

## 2017-03-31 DIAGNOSIS — R56.9 UNSPECIFIED CONVULSIONS: ICD-10-CM

## 2017-03-31 DIAGNOSIS — E87.6 HYPOKALEMIA: ICD-10-CM

## 2017-03-31 DIAGNOSIS — G40.409 OTHER GENERALIZED EPILEPSY AND EPILEPTIC SYNDROMES, NOT INTRACTABLE, WITHOUT STATUS EPILEPTICUS: ICD-10-CM

## 2017-04-04 ENCOUNTER — OTHER (OUTPATIENT)
Age: 22
End: 2017-04-04

## 2017-04-14 ENCOUNTER — OUTPATIENT (OUTPATIENT)
Dept: OUTPATIENT SERVICES | Facility: HOSPITAL | Age: 22
LOS: 1 days | End: 2017-04-14
Payer: COMMERCIAL

## 2017-04-14 ENCOUNTER — APPOINTMENT (OUTPATIENT)
Dept: NEUROLOGY | Facility: CLINIC | Age: 22
End: 2017-04-14

## 2017-04-14 VITALS
BODY MASS INDEX: 20.77 KG/M2 | DIASTOLIC BLOOD PRESSURE: 81 MMHG | HEIGHT: 61 IN | SYSTOLIC BLOOD PRESSURE: 123 MMHG | WEIGHT: 110 LBS | HEART RATE: 60 BPM | TEMPERATURE: 98.1 F | OXYGEN SATURATION: 99 %

## 2017-04-14 DIAGNOSIS — G40.309 GENERALIZED IDIOPATHIC EPILEPSY AND EPILEPTIC SYNDROMES, NOT INTRACTABLE, W/OUT STATUS EPILEPTICUS: ICD-10-CM

## 2017-04-14 PROCEDURE — 70551 MRI BRAIN STEM W/O DYE: CPT | Mod: 26

## 2017-04-14 PROCEDURE — 70551 MRI BRAIN STEM W/O DYE: CPT

## 2017-04-14 RX ORDER — NORETHINDRONE ACETATE AND ETHINYL ESTRADIOL, ETHINYL ESTRADIOL AND FERROUS FUMARATE 1MG-10(24)
1 MG-10 MCG / KIT ORAL DAILY
Refills: 0 | Status: ACTIVE | COMMUNITY
Start: 2017-01-26

## 2017-04-14 RX ORDER — NORETHINDRONE ACETATE AND ETHINYL ESTRADIOL AND FERROUS FUMARATE 1MG-20(21)
1-20 KIT ORAL
Qty: 28 | Refills: 0 | Status: ACTIVE | COMMUNITY
Start: 2016-01-14

## 2017-04-14 RX ORDER — ZOLPIDEM TARTRATE 10 MG/1
10 TABLET ORAL
Qty: 20 | Refills: 0 | Status: ACTIVE | COMMUNITY
Start: 2017-02-07

## 2017-04-20 ENCOUNTER — RESULT REVIEW (OUTPATIENT)
Age: 22
End: 2017-04-20

## 2017-04-20 ENCOUNTER — OTHER (OUTPATIENT)
Age: 22
End: 2017-04-20

## 2017-04-20 RX ORDER — LEVETIRACETAM 250 MG/1
250 TABLET, FILM COATED ORAL TWICE DAILY
Qty: 60 | Refills: 5 | Status: DISCONTINUED | COMMUNITY
Start: 2017-04-14 | End: 2017-04-20

## 2017-04-28 ENCOUNTER — OTHER (OUTPATIENT)
Age: 22
End: 2017-04-28

## 2017-04-28 ENCOUNTER — RX RENEWAL (OUTPATIENT)
Age: 22
End: 2017-04-28

## 2017-04-28 ENCOUNTER — APPOINTMENT (OUTPATIENT)
Dept: NEUROLOGY | Facility: CLINIC | Age: 22
End: 2017-04-28

## 2017-05-03 ENCOUNTER — RX RENEWAL (OUTPATIENT)
Age: 22
End: 2017-05-03

## 2017-05-03 RX ORDER — LEVETIRACETAM 500 MG/1
500 TABLET, FILM COATED, EXTENDED RELEASE ORAL
Qty: 30 | Refills: 5 | Status: ACTIVE | COMMUNITY
Start: 2017-04-20 | End: 1900-01-01

## 2017-05-04 ENCOUNTER — APPOINTMENT (OUTPATIENT)
Dept: SLEEP CENTER | Facility: HOSPITAL | Age: 22
End: 2017-05-04

## 2017-05-05 ENCOUNTER — RX RENEWAL (OUTPATIENT)
Age: 22
End: 2017-05-05

## 2017-05-12 ENCOUNTER — RX RENEWAL (OUTPATIENT)
Age: 22
End: 2017-05-12

## 2017-05-12 RX ORDER — B-COMPLEX WITH VITAMIN C
1250 (500 CA) TABLET ORAL
Qty: 30 | Refills: 5 | Status: ACTIVE | COMMUNITY
Start: 2017-03-29 | End: 1900-01-01

## 2017-05-12 RX ORDER — FOLIC ACID 1 MG/1
1 TABLET ORAL
Qty: 30 | Refills: 5 | Status: ACTIVE | COMMUNITY
Start: 2017-03-29 | End: 1900-01-01

## 2017-07-11 ENCOUNTER — OTHER (OUTPATIENT)
Age: 22
End: 2017-07-11

## 2017-07-13 NOTE — DISCHARGE NOTE ADULT - CARE PLAN
Yes Principal Discharge DX:	Seizure  Goal:	To figure out the cause of your seizures and to treat them  Instructions for follow-up, activity and diet:	You came to the hospital after a witnessed seizure episode. You were monitored on a video EEG and the neurologist was able to identify that you have a seizure disorder called epilepsy. We are sending you home with a prescription for keppra, calcium, folic acid, and vitamin D. All of these medicines are intended to prevent future seizures. Please get an MRI brain as an outpatient. Please make an appointment to follow up with Dr. Valdez in 2 weeks. Principal Discharge DX:	Seizure  Goal:	To figure out the cause of your seizures and to treat them  Instructions for follow-up, activity and diet:	You came to the hospital after a witnessed seizure episode. You were monitored on a video EEG and the neurologist was able to identify that you have a seizure disorder called epilepsy. We are sending you home with a prescription for keppra, calcium, folic acid, and vitamin D. All of these medicines are intended to prevent future seizures.  Please make an appointment to follow up with Dr. Valdez in 2 weeks. Principal Discharge DX:	Seizure  Goal:	To figure out the cause of your seizures and to treat them  Instructions for follow-up, activity and diet:	You came to the hospital after a witnessed seizure episode. You were monitored on a video EEG and the neurologist was able to identify that you have a seizure disorder called epilepsy. We are sending you home with a prescription for keppra, calcium, folic acid, and vitamin D. All of these medicines are intended to prevent future seizures. Please call Dr. Valdez' office to help organize you for an outpatient MRI.  Please make an appointment to follow up with Dr. Valdez in 2 weeks. Principal Discharge DX:	Seizure  Goal:	To figure out the cause of your seizures and to treat them  Instructions for follow-up, activity and diet:	You came to the hospital after a witnessed seizure episode. You were monitored on a video EEG and the neurologist was able to identify that you have a seizure disorder called epilepsy. We are sending you home with a prescription for keppra, calcium, folic acid, and vitamin D. All of these medicines are intended to prevent future seizures. Please call Dr. Valdez' office to help organize you for an outpatient MRI.  Please make an appointment to follow up with Dr. Valdez in 2 weeks. If you have seizure symptoms before your appointment then please see Dr. Valdez as soon as possible.

## 2017-08-17 ENCOUNTER — OTHER (OUTPATIENT)
Age: 22
End: 2017-08-17

## 2017-10-13 ENCOUNTER — APPOINTMENT (OUTPATIENT)
Dept: NEUROLOGY | Facility: CLINIC | Age: 22
End: 2017-10-13

## 2022-01-20 NOTE — ED ADULT NURSE NOTE - NS ED NURSE RECORD ANOTHER HT AND WT
Please called to reschedule appointment to be in person. Patient is having trouble with [facility].  
Yes